# Patient Record
Sex: FEMALE | Race: WHITE | NOT HISPANIC OR LATINO | Employment: OTHER | URBAN - METROPOLITAN AREA
[De-identification: names, ages, dates, MRNs, and addresses within clinical notes are randomized per-mention and may not be internally consistent; named-entity substitution may affect disease eponyms.]

---

## 2017-08-01 ENCOUNTER — HOSPITAL ENCOUNTER (INPATIENT)
Facility: HOSPITAL | Age: 72
LOS: 1 days | Discharge: HOME | End: 2017-08-02
Attending: INTERNAL MEDICINE | Admitting: INTERNAL MEDICINE

## 2017-08-01 DIAGNOSIS — I20.0 UNSTABLE ANGINA (HCC): Primary | ICD-10-CM

## 2017-08-01 PROCEDURE — 93005 ELECTROCARDIOGRAM TRACING: CPT | Performed by: INTERNAL MEDICINE

## 2017-08-01 RX ORDER — ROPINIROLE 2 MG/1
2 TABLET, FILM COATED ORAL NIGHTLY
Status: DISCONTINUED | OUTPATIENT
Start: 2017-08-01 | End: 2017-08-02 | Stop reason: HOSPADM

## 2017-08-01 RX ORDER — TRAMADOL HYDROCHLORIDE 50 MG/1
50 TABLET ORAL EVERY 8 HOURS PRN
Status: DISCONTINUED | OUTPATIENT
Start: 2017-08-01 | End: 2017-08-02 | Stop reason: HOSPADM

## 2017-08-01 RX ORDER — TRIAMCINOLONE ACETONIDE 1 MG/ML
LOTION TOPICAL EVERY 12 HOURS SCHEDULED
Status: DISCONTINUED | OUTPATIENT
Start: 2017-08-01 | End: 2017-08-02 | Stop reason: HOSPADM

## 2017-08-01 RX ORDER — MELOXICAM 15 MG/1
15 TABLET ORAL NIGHTLY PRN
COMMUNITY

## 2017-08-01 RX ORDER — NITROGLYCERIN 20 MG/100ML
5-200 INJECTION INTRAVENOUS
Status: DISCONTINUED | OUTPATIENT
Start: 2017-08-01 | End: 2017-08-02 | Stop reason: HOSPADM

## 2017-08-01 RX ORDER — TRAMADOL HYDROCHLORIDE 50 MG/1
50 TABLET ORAL EVERY 8 HOURS PRN
COMMUNITY

## 2017-08-01 RX ORDER — TRIAMCINOLONE ACETONIDE 1 MG/ML
1 LOTION TOPICAL 2 TIMES DAILY
COMMUNITY

## 2017-08-01 RX ORDER — FENOFIBRATE 160 MG/1
160 TABLET ORAL DAILY
COMMUNITY

## 2017-08-01 RX ORDER — AMLODIPINE BESYLATE 5 MG/1
5 TABLET ORAL
Status: DISCONTINUED | OUTPATIENT
Start: 2017-08-02 | End: 2017-08-02 | Stop reason: HOSPADM

## 2017-08-01 RX ORDER — ATORVASTATIN CALCIUM 40 MG/1
40 TABLET, FILM COATED ORAL NIGHTLY
Status: DISCONTINUED | OUTPATIENT
Start: 2017-08-01 | End: 2017-08-02 | Stop reason: HOSPADM

## 2017-08-01 RX ORDER — OMEPRAZOLE 40 MG/1
40 CAPSULE, DELAYED RELEASE ORAL DAILY
COMMUNITY

## 2017-08-01 RX ORDER — FAMOTIDINE 20 MG/1
40 TABLET, FILM COATED ORAL DAILY
Status: DISCONTINUED | OUTPATIENT
Start: 2017-08-02 | End: 2017-08-02 | Stop reason: HOSPADM

## 2017-08-01 RX ORDER — ROPINIROLE 2 MG/1
2 TABLET, FILM COATED ORAL NIGHTLY
COMMUNITY

## 2017-08-01 RX ORDER — SIMVASTATIN 40 MG
40 TABLET ORAL NIGHTLY
COMMUNITY

## 2017-08-01 RX ORDER — ASPIRIN 325 MG
325 TABLET ORAL DAILY
Status: DISCONTINUED | OUTPATIENT
Start: 2017-08-02 | End: 2017-08-02 | Stop reason: HOSPADM

## 2017-08-01 RX ORDER — GABAPENTIN 400 MG/1
400 CAPSULE ORAL 3 TIMES DAILY
COMMUNITY

## 2017-08-01 RX ORDER — LISINOPRIL 40 MG/1
40 TABLET ORAL
Status: DISCONTINUED | OUTPATIENT
Start: 2017-08-02 | End: 2017-08-02 | Stop reason: HOSPADM

## 2017-08-01 RX ORDER — GABAPENTIN 400 MG/1
400 CAPSULE ORAL 3 TIMES DAILY
Status: DISCONTINUED | OUTPATIENT
Start: 2017-08-01 | End: 2017-08-02 | Stop reason: HOSPADM

## 2017-08-01 RX ORDER — FENOFIBRATE 145 MG/1
160 TABLET, COATED ORAL DAILY
Status: DISCONTINUED | OUTPATIENT
Start: 2017-08-02 | End: 2017-08-02 | Stop reason: HOSPADM

## 2017-08-01 RX ORDER — LISINOPRIL 40 MG/1
40 TABLET ORAL DAILY
COMMUNITY

## 2017-08-01 RX ORDER — MELOXICAM 7.5 MG/1
15 TABLET ORAL DAILY
Status: DISCONTINUED | OUTPATIENT
Start: 2017-08-01 | End: 2017-08-02 | Stop reason: HOSPADM

## 2017-08-01 RX ORDER — AMLODIPINE BESYLATE 5 MG/1
5 TABLET ORAL DAILY
COMMUNITY

## 2017-08-01 RX ADMIN — TRAMADOL HYDROCHLORIDE 50 MG: 50 TABLET, COATED ORAL at 21:29

## 2017-08-01 RX ADMIN — MELOXICAM 15 MG: 7.5 TABLET ORAL at 21:30

## 2017-08-01 RX ADMIN — TRIAMCINOLONE ACETONIDE: 1 LOTION TOPICAL at 21:29

## 2017-08-01 RX ADMIN — NITROGLYCERIN 5 MCG/MIN: 20 INJECTION INTRAVENOUS at 18:36

## 2017-08-01 RX ADMIN — ATORVASTATIN CALCIUM 40 MG: 40 TABLET, FILM COATED ORAL at 21:30

## 2017-08-01 RX ADMIN — ROPINIROLE HYDROCHLORIDE 2 MG: 2 TABLET, FILM COATED ORAL at 21:30

## 2017-08-01 RX ADMIN — GABAPENTIN 400 MG: 400 CAPSULE ORAL at 21:30

## 2017-08-01 NOTE — NURSING NOTE
"ACC REVIEW REPORT: [facility]        PATIENT NAME: Danna Antonio    PATIENT ID: [id number]    BED: 9803    BED TYPE: [facility]    BED GIVEN TO: Val    FLOWER BED GIVEN: 1430    YOB: 1945    AGE: 71    GENDER: female    PREVIOUS ADMIT TO [facility]: no    PREVIOUS ADMISSION DATE: n/a    TODAY'S DATE: 8/1/2017    TRANSFER DATE: 8/1/2017    ETA: 1700    TRANSFERRING FACILITY: [facility]    TRANSFERRING FACILITY PHONE # : [phone]    DATE/TIME REQUEST RECEIVED: 8/1/2017 @ [address] [place] [address] RN: Jessica Mckeon RN    REPORT FROM: Val Greenwood RN    TIME REPORT TAKEN: 1440    DIAGNOSIS: NSTEMI    REASON FOR TRANSFER TO [facility]: Cardiac Cath    TRANSPORTATION: EMS ground    CLINICAL REASON FOR TRANSFER TO [facility]: 71 year old female presented to ER at 1202 c/o chest pain \"on and off for a few days.\"  Describes pain as \"burning\" that radiates through to her back.  Denies SOA or nausea.  Elevated troponin.  NSR on monitor.      CLINICAL INFORMATION      WEIGHT: 71 kg    ALLERGIES: PCN    BLACKWOOD: no    INFECTIOUS DISEASE: no    ISOLATION: no    1ST VITAL SIGNS:   TIME: 1205  PULSE: 82  B/P: 172/97  RESP: 18    LAST VITAL SIGNS:  TIME: 1430  TEMP: 97.0  PULSE: 63  B/P: 151/73  RESP: 16    LAB INFORMATION: Labs WNL    CULTURE INFORMATION: n/a    MEDS/IV FLUIDS: # 22 gauge saline lock right hand.   mg po.  Plavix 600mg po.  Lopressor 5mg IV.      CARDIAC SYSTEM:    CHEST PAIN: currently pain free    RATE: 82 on EKG    RHYTHM: NSR    Is patient taking or has patient been given any drugs that could increase bleeding?   (Plavix, Brilinta, Effient, Eliquis, Xarelto, Warfarin, Integrilin, Angiomax)    DRUG: ASA 325mg.  Plavix 600mg in ER         CARDIAC ENZYMES:    DATE: 7/31/2017  TIME: drawn in family doctor's office  TROP: 0.07    DATE: 8/1/2017  TIME: 1204  TROP: 0.167      RESPIRATORY SYSTEM:    LUNG SOUNDS:  Clear      OXYGEN: yes    O2 SAT: 97 %    ADMINISTRATION ROUTE: nasal cannula    RADIOLOGY RESULTS: " "Cardiomegaly and Emphysema          CNS/MUSCULOSKELETAL    ALERT AND ORIENTED:    PERSON: yes  PLACE: yes  TIME: yes    INJURY:  N/A  STROKE SCALE: N/A        CNS/MUSCULOSKELETAL NOTES: Ambulatory      GI//GY      ABDOMINAL PAIN: No    VOMITING: No    DIARRHEA: No    NAUSEA: No      PAST MEDICAL HISTORY: HTN, Hyperlipidemia, Smoker    OTHER SYMPTOM NOTES: Pt states she has \"never been sick and never been in the hospital\"              Jessica Mckeon RN  8/1/2017  2:50 PM        "

## 2017-08-02 ENCOUNTER — APPOINTMENT (OUTPATIENT)
Dept: CARDIOLOGY | Facility: HOSPITAL | Age: 72
End: 2017-08-02
Attending: INTERNAL MEDICINE

## 2017-08-02 VITALS
HEART RATE: 75 BPM | HEIGHT: 62 IN | BODY MASS INDEX: 30.63 KG/M2 | TEMPERATURE: 98.7 F | RESPIRATION RATE: 16 BRPM | DIASTOLIC BLOOD PRESSURE: 75 MMHG | WEIGHT: 166.45 LBS | SYSTOLIC BLOOD PRESSURE: 131 MMHG | OXYGEN SATURATION: 96 %

## 2017-08-02 LAB
ANION GAP SERPL CALCULATED.3IONS-SCNC: 4 MMOL/L (ref 3–11)
ARTICHOKE IGE QN: 120 MG/DL (ref 0–130)
BH CV ECHO MEAS - AO ROOT AREA (BSA CORRECTED): 1.2
BH CV ECHO MEAS - AO ROOT AREA: 3.6 CM^2
BH CV ECHO MEAS - AO ROOT DIAM: 2.1 CM
BH CV ECHO MEAS - BSA(HAYCOCK): 1.8 M^2
BH CV ECHO MEAS - BSA: 1.8 M^2
BH CV ECHO MEAS - BZI_BMI: 30.4 KILOGRAMS/M^2
BH CV ECHO MEAS - BZI_METRIC_HEIGHT: 157.5 CM
BH CV ECHO MEAS - BZI_METRIC_WEIGHT: 75.3 KG
BH CV ECHO MEAS - EDV(CUBED): 39.5 ML
BH CV ECHO MEAS - EDV(TEICH): 47.6 ML
BH CV ECHO MEAS - EF(CUBED): 64.1 %
BH CV ECHO MEAS - EF(TEICH): 56.8 %
BH CV ECHO MEAS - ESV(CUBED): 14.2 ML
BH CV ECHO MEAS - ESV(TEICH): 20.6 ML
BH CV ECHO MEAS - FS: 28.9 %
BH CV ECHO MEAS - IVS/LVPW: 1
BH CV ECHO MEAS - IVSD: 1.1 CM
BH CV ECHO MEAS - LA DIMENSION: 3.2 CM
BH CV ECHO MEAS - LA/AO: 1.5
BH CV ECHO MEAS - LV MASS(C)D: 110.8 GRAMS
BH CV ECHO MEAS - LV MASS(C)DI: 62.7 GRAMS/M^2
BH CV ECHO MEAS - LVIDD: 3.4 CM
BH CV ECHO MEAS - LVIDS: 2.4 CM
BH CV ECHO MEAS - LVPWD: 1.1 CM
BH CV ECHO MEAS - MV A MAX VEL: 109.6 CM/SEC
BH CV ECHO MEAS - MV DEC SLOPE: 550 CM/SEC^2
BH CV ECHO MEAS - MV DEC TIME: 0.22 SEC
BH CV ECHO MEAS - MV E MAX VEL: 89.3 CM/SEC
BH CV ECHO MEAS - MV E/A: 0.82
BH CV ECHO MEAS - MV P1/2T MAX VEL: 127.1 CM/SEC
BH CV ECHO MEAS - MV P1/2T: 67.7 MSEC
BH CV ECHO MEAS - MVA P1/2T LCG: 1.7 CM^2
BH CV ECHO MEAS - MVA(P1/2T): 3.3 CM^2
BH CV ECHO MEAS - PA ACC SLOPE: 553.2 CM/SEC^2
BH CV ECHO MEAS - PA ACC TIME: 0.13 SEC
BH CV ECHO MEAS - PA PR(ACCEL): 20.4 MMHG
BH CV ECHO MEAS - RAP SYSTOLE: 3 MMHG
BH CV ECHO MEAS - RV MAX PG: 1.2 MMHG
BH CV ECHO MEAS - RV V1 MAX: 55.8 CM/SEC
BH CV ECHO MEAS - RVSP: 35 MMHG
BH CV ECHO MEAS - SI(CUBED): 14.3 ML/M^2
BH CV ECHO MEAS - SI(TEICH): 15.3 ML/M^2
BH CV ECHO MEAS - SV(CUBED): 25.3 ML
BH CV ECHO MEAS - SV(TEICH): 27.1 ML
BH CV ECHO MEAS - TAPSE (>1.6): 2.3 CM2
BH CV ECHO MEAS - TR MAX VEL: 284 CM/SEC
BH CV XLRA - RV BASE: 3.7 CM
BH CV XLRA - RV LENGTH: 6.8 CM
BH CV XLRA - RV MID: 3.1 CM
BH CV XLRA - TDI S': 12.4 CM/SEC
BUN BLD-MCNC: 18 MG/DL (ref 9–23)
BUN/CREAT SERPL: 22.5 (ref 7–25)
CALCIUM SPEC-SCNC: 8.9 MG/DL (ref 8.7–10.4)
CHLORIDE SERPL-SCNC: 106 MMOL/L (ref 99–109)
CHOLEST SERPL-MCNC: 185 MG/DL (ref 0–200)
CO2 SERPL-SCNC: 30 MMOL/L (ref 20–31)
CREAT BLD-MCNC: 0.8 MG/DL (ref 0.6–1.3)
DEPRECATED RDW RBC AUTO: 45.1 FL (ref 37–54)
ERYTHROCYTE [DISTWIDTH] IN BLOOD BY AUTOMATED COUNT: 13.1 % (ref 11.3–14.5)
GFR SERPL CREATININE-BSD FRML MDRD: 71 ML/MIN/1.73
GLUCOSE BLD-MCNC: 100 MG/DL (ref 70–100)
HCT VFR BLD AUTO: 32.7 % (ref 34.5–44)
HDLC SERPL-MCNC: 45 MG/DL (ref 40–60)
HGB BLD-MCNC: 10.8 G/DL (ref 11.5–15.5)
LEFT ATRIUM VOLUME INDEX: 24.9 ML/M2
LEFT ATRIUM VOLUME: 44 CM3
MCH RBC QN AUTO: 31 PG (ref 27–31)
MCHC RBC AUTO-ENTMCNC: 33 G/DL (ref 32–36)
MCV RBC AUTO: 94 FL (ref 80–99)
PLATELET # BLD AUTO: 209 10*3/MM3 (ref 150–450)
PMV BLD AUTO: 10.2 FL (ref 6–12)
POTASSIUM BLD-SCNC: 4.3 MMOL/L (ref 3.5–5.5)
RBC # BLD AUTO: 3.48 10*6/MM3 (ref 3.89–5.14)
SODIUM BLD-SCNC: 140 MMOL/L (ref 132–146)
TRIGL SERPL-MCNC: 221 MG/DL (ref 0–150)
TROPONIN I SERPL-MCNC: 0.17 NG/ML
WBC NRBC COR # BLD: 4.46 10*3/MM3 (ref 3.5–10.8)

## 2017-08-02 PROCEDURE — 027034Z DILATION OF CORONARY ARTERY, ONE ARTERY WITH DRUG-ELUTING INTRALUMINAL DEVICE, PERCUTANEOUS APPROACH: ICD-10-PCS | Performed by: INTERNAL MEDICINE

## 2017-08-02 PROCEDURE — C9600 PERC DRUG-EL COR STENT SING: HCPCS | Performed by: INTERNAL MEDICINE

## 2017-08-02 PROCEDURE — 0 IOPAMIDOL PER 1 ML: Performed by: INTERNAL MEDICINE

## 2017-08-02 PROCEDURE — 84484 ASSAY OF TROPONIN QUANT: CPT | Performed by: INTERNAL MEDICINE

## 2017-08-02 PROCEDURE — 25010000002 FENTANYL CITRATE (PF) 100 MCG/2ML SOLUTION: Performed by: INTERNAL MEDICINE

## 2017-08-02 PROCEDURE — 93571 IV DOP VEL&/PRESS C FLO 1ST: CPT | Performed by: INTERNAL MEDICINE

## 2017-08-02 PROCEDURE — 80048 BASIC METABOLIC PNL TOTAL CA: CPT | Performed by: INTERNAL MEDICINE

## 2017-08-02 PROCEDURE — 25010000002 MIDAZOLAM PER 1 MG: Performed by: INTERNAL MEDICINE

## 2017-08-02 PROCEDURE — 93306 TTE W/DOPPLER COMPLETE: CPT

## 2017-08-02 PROCEDURE — C1753 CATH, INTRAVAS ULTRASOUND: HCPCS | Performed by: INTERNAL MEDICINE

## 2017-08-02 PROCEDURE — C1894 INTRO/SHEATH, NON-LASER: HCPCS | Performed by: INTERNAL MEDICINE

## 2017-08-02 PROCEDURE — 4A023N7 MEASUREMENT OF CARDIAC SAMPLING AND PRESSURE, LEFT HEART, PERCUTANEOUS APPROACH: ICD-10-PCS | Performed by: INTERNAL MEDICINE

## 2017-08-02 PROCEDURE — C1769 GUIDE WIRE: HCPCS | Performed by: INTERNAL MEDICINE

## 2017-08-02 PROCEDURE — 93458 L HRT ARTERY/VENTRICLE ANGIO: CPT | Performed by: INTERNAL MEDICINE

## 2017-08-02 PROCEDURE — 25010000002 HEPARIN (PORCINE) PER 1000 UNITS: Performed by: INTERNAL MEDICINE

## 2017-08-02 PROCEDURE — 80061 LIPID PANEL: CPT | Performed by: INTERNAL MEDICINE

## 2017-08-02 PROCEDURE — 25010000002 BIVALIRUDIN PER 1 MG: Performed by: INTERNAL MEDICINE

## 2017-08-02 PROCEDURE — 93005 ELECTROCARDIOGRAM TRACING: CPT | Performed by: INTERNAL MEDICINE

## 2017-08-02 PROCEDURE — 92978 ENDOLUMINL IVUS OCT C 1ST: CPT | Performed by: INTERNAL MEDICINE

## 2017-08-02 PROCEDURE — 85027 COMPLETE CBC AUTOMATED: CPT | Performed by: INTERNAL MEDICINE

## 2017-08-02 PROCEDURE — C1874 STENT, COATED/COV W/DEL SYS: HCPCS | Performed by: INTERNAL MEDICINE

## 2017-08-02 PROCEDURE — B2111ZZ FLUOROSCOPY OF MULTIPLE CORONARY ARTERIES USING LOW OSMOLAR CONTRAST: ICD-10-PCS | Performed by: INTERNAL MEDICINE

## 2017-08-02 PROCEDURE — 25010000002 BIVALIRUDIN 5 MG/ML: Performed by: INTERNAL MEDICINE

## 2017-08-02 PROCEDURE — B240ZZ3 ULTRASONOGRAPHY OF SINGLE CORONARY ARTERY, INTRAVASCULAR: ICD-10-PCS | Performed by: INTERNAL MEDICINE

## 2017-08-02 DEVICE — IMPLANTABLE DEVICE: Type: IMPLANTABLE DEVICE | Status: FUNCTIONAL

## 2017-08-02 RX ORDER — SODIUM CHLORIDE 9 MG/ML
250 INJECTION, SOLUTION INTRAVENOUS CONTINUOUS
Status: ACTIVE | OUTPATIENT
Start: 2017-08-02 | End: 2017-08-02

## 2017-08-02 RX ORDER — HYDROCODONE BITARTRATE AND ACETAMINOPHEN 5; 325 MG/1; MG/1
1 TABLET ORAL EVERY 4 HOURS PRN
Status: DISCONTINUED | OUTPATIENT
Start: 2017-08-02 | End: 2017-08-02 | Stop reason: HOSPADM

## 2017-08-02 RX ORDER — NALOXONE HCL 0.4 MG/ML
0.4 VIAL (ML) INJECTION
Status: DISCONTINUED | OUTPATIENT
Start: 2017-08-02 | End: 2017-08-02 | Stop reason: HOSPADM

## 2017-08-02 RX ORDER — ACETAMINOPHEN 325 MG/1
650 TABLET ORAL EVERY 4 HOURS PRN
Status: DISCONTINUED | OUTPATIENT
Start: 2017-08-02 | End: 2017-08-02 | Stop reason: HOSPADM

## 2017-08-02 RX ORDER — MORPHINE SULFATE 4 MG/ML
1 INJECTION, SOLUTION INTRAMUSCULAR; INTRAVENOUS EVERY 4 HOURS PRN
Status: DISCONTINUED | OUTPATIENT
Start: 2017-08-02 | End: 2017-08-02 | Stop reason: HOSPADM

## 2017-08-02 RX ORDER — ALPRAZOLAM 0.25 MG/1
0.25 TABLET ORAL 3 TIMES DAILY PRN
Status: DISCONTINUED | OUTPATIENT
Start: 2017-08-02 | End: 2017-08-02 | Stop reason: HOSPADM

## 2017-08-02 RX ORDER — LIDOCAINE HYDROCHLORIDE 10 MG/ML
INJECTION, SOLUTION INFILTRATION; PERINEURAL AS NEEDED
Status: DISCONTINUED | OUTPATIENT
Start: 2017-08-02 | End: 2017-08-02 | Stop reason: HOSPADM

## 2017-08-02 RX ORDER — TEMAZEPAM 7.5 MG/1
7.5 CAPSULE ORAL NIGHTLY PRN
Status: DISCONTINUED | OUTPATIENT
Start: 2017-08-02 | End: 2017-08-02 | Stop reason: HOSPADM

## 2017-08-02 RX ORDER — CLOPIDOGREL BISULFATE 75 MG/1
TABLET ORAL AS NEEDED
Status: DISCONTINUED | OUTPATIENT
Start: 2017-08-02 | End: 2017-08-02 | Stop reason: HOSPADM

## 2017-08-02 RX ORDER — FENTANYL CITRATE 50 UG/ML
INJECTION, SOLUTION INTRAMUSCULAR; INTRAVENOUS AS NEEDED
Status: DISCONTINUED | OUTPATIENT
Start: 2017-08-02 | End: 2017-08-02 | Stop reason: HOSPADM

## 2017-08-02 RX ORDER — MIDAZOLAM HYDROCHLORIDE 1 MG/ML
INJECTION INTRAMUSCULAR; INTRAVENOUS AS NEEDED
Status: DISCONTINUED | OUTPATIENT
Start: 2017-08-02 | End: 2017-08-02 | Stop reason: HOSPADM

## 2017-08-02 RX ADMIN — TRAMADOL HYDROCHLORIDE 50 MG: 50 TABLET, COATED ORAL at 08:21

## 2017-08-02 RX ADMIN — ASPIRIN 325 MG ORAL TABLET 325 MG: 325 PILL ORAL at 08:21

## 2017-08-02 RX ADMIN — AMLODIPINE BESYLATE 5 MG: 5 TABLET ORAL at 08:21

## 2017-08-02 RX ADMIN — FAMOTIDINE 40 MG: 20 TABLET ORAL at 08:21

## 2017-08-02 RX ADMIN — LISINOPRIL 40 MG: 40 TABLET ORAL at 08:22

## 2017-08-02 RX ADMIN — FENOFIBRATE 145 MG: 145 TABLET ORAL at 08:21

## 2017-08-02 RX ADMIN — GABAPENTIN 400 MG: 400 CAPSULE ORAL at 08:21

## 2017-08-02 NOTE — H&P
Pre-cardiac Catheterization Report  Cardiovascular Laboratory  [facility]      Patient:  Danna Antonio  :  1945  PCP:  No Known Provider  PHONE:  None    DATE: 2017    BRIEF HPI:  Danna Antonio is a 71 y.o. female with hypertension, hypercholesterolemia, and ongoing tobacco abuse.  She is complaining of a 1-1/2 week history of chest pain which she describes as indigestion.  She states that it is moderate in severity lasting minutes.  States that it radiates to her back.  She denies any shortness of breath, palpitations, or nausea.  But does have episodes of diaphoresis with her chest pain.  She is been transferred to [facility] for left heart catheterization with possible intervention.    Cardiac Risk Factors:  advanced age (older than 55 for men, 65 for women), dyslipidemia, family history of premature cardiovascular disease, hypertension, smoking/ tobacco exposure    Anginal class in last 2 weeks:  CCS class III    CHF Class in last 2 weeks:  NYHA Class I    Cardiogenic shock:  no    Cardiac arrest <24 hours:  no    Stress test within last 6 months:   no   Details:    Previous cardiac catheterization:  no  Details:     Previous CABG:  no  Details:      Allergies:     IV contrast allergy:  no  Allergies   Allergen Reactions   • Penicillins        MEDICATIONS:  Prior to Admission medications    Medication Sig Start Date End Date Taking? Authorizing Provider   amLODIPine (NORVASC) 5 MG tablet Take 5 mg by mouth Daily.   Yes Historical Provider, MD   fenofibrate 160 MG tablet Take 160 mg by mouth Daily.   Yes Historical Provider, MD   gabapentin (NEURONTIN) 400 MG capsule Take 400 mg by mouth 3 (Three) Times a Day.   Yes Historical Provider, MD   lisinopril (PRINIVIL,ZESTRIL) 40 MG tablet Take 40 mg by mouth Daily.   Yes Historical Provider, MD   meloxicam (MOBIC) 15 MG tablet Take 15 mg by mouth At Night As Needed.   Yes Historical Provider, MD   omeprazole (priLOSEC) 40 MG  capsule Take 40 mg by mouth Daily.   Yes Historical Provider, MD   rOPINIRole (REQUIP) 2 MG tablet Take 2 mg by mouth Every Night.   Yes Historical Provider, MD   simvastatin (ZOCOR) 40 MG tablet Take 40 mg by mouth Every Night.   Yes Historical Provider, MD   traMADol (ULTRAM) 50 MG tablet Take 50 mg by mouth Every 8 (Eight) Hours As Needed for Moderate Pain (4-6).   Yes Historical Provider, MD   triamcinolone (KENALOG) 0.1 % lotion Apply 1 application topically 2 (Two) Times a Day.   Yes Historical Provider, MD       Past medical & surgical history, social and family history reviewed in the electronic medical record.    ROS:  Cardiovascular ROS: positive for - chest pain and diaphoresis    Physical Exam:    Vitals:   Vitals:    08/02/17 0821   BP: 130/67   Pulse:    Resp:    Temp:    SpO2:     Last Weight    08/01/17  1728   Weight: 166 lb 7.2 oz (75.5 kg)       General Appearance:    Alert, cooperative, in no acute distress   Head:    Normocephalic, without obvious abnormality, atraumatic   Eyes:            Lids and lashes normal, conjunctivae and sclerae normal, no   icterus, no pallor, corneas clear, PERRLA   Ears:    Ears appear intact with no abnormalities noted   Throat:      Neck:   No adenopathy, supple, trachea midline, no thyromegaly, no     carotid bruit, no JVD   Back:     No kyphosis present, no scoliosis present, range of motion normal   Lungs:     Clear to auscultation,respirations regular, even and                   unlabored    Heart:    Regular rhythm and normal rate, normal S1 and S2, no            murmur, no gallop, no rub, no click   Breast Exam:    Deferred   Abdomen:     Normal bowel sounds, no masses, no organomegaly, soft        non-tender, non-distended, no guarding, no rebound                 tenderness   Genitalia:    Deferred   Extremities:   Moves all extremities well, no edema, no cyanosis, no              redness   Pulses:   Pulses palpable and equal bilaterally   Skin:   No  bleeding, bruising or rash   Lymph nodes:      Neurologic:   Cranial nerves 2 - 12 grossly intact, sensation intact       Barbaeu Test:  Left: Normal  (oxymetric Allens) Right: Not Assessed     Results from last 7 days  Lab Units 08/02/17  0516   SODIUM mmol/L 140   POTASSIUM mmol/L 4.3   CHLORIDE mmol/L 106   CO2 mmol/L 30.0   BUN mg/dL 18   CREATININE mg/dL 0.80   GLUCOSE mg/dL 100   CALCIUM mg/dL 8.9       Results from last 7 days  Lab Units 08/02/17  0516   WBC 10*3/mm3 4.46   HEMOGLOBIN g/dL 10.8*   HEMATOCRIT % 32.7*   PLATELETS 10*3/mm3 209     Lab Results   Component Value Date    TRIG 221 (H) 08/02/2017    HDL 45 08/02/2017    LDLDIRECT 120 08/02/2017           IMPRESSION:  · Unstable angina    PLAN:  · Procedure to perform: [facility]  · Planned access:  Left radial artery

## 2017-08-03 NOTE — PLAN OF CARE
Problem: Patient Care Overview (Adult)  Goal: Plan of Care Review  Outcome: Outcome(s) achieved Date Met:  08/02/17 08/02/17 2022   Coping/Psychosocial Response Interventions   Plan Of Care Reviewed With patient;daughter   Patient Care Overview   Progress improving   Outcome Evaluation   Outcome Summary/Follow up Plan pt and family able to verbalize all discharge instructions. no questions at this time.       Goal: Discharge Needs Assessment  Outcome: Outcome(s) achieved Date Met:  08/02/17    Problem: Cardiac Catheterization with/without PCI (Adult)  Goal: Signs and Symptoms of Listed Potential Problems Will be Absent or Manageable (Cardiac Catheterization with/without PCI)  Outcome: Outcome(s) achieved Date Met:  08/02/17 08/02/17 2022   Cardiac Catheterization with/without PCI   Problems Assessed (Cardiac Catheterization) all   Problems Present (Cardiac Catheterization) none

## 2017-08-14 ENCOUNTER — DOCUMENTATION (OUTPATIENT)
Dept: CARDIAC REHAB | Facility: HOSPITAL | Age: 72
End: 2017-08-14

## 2017-08-14 NOTE — PROGRESS NOTES
Pt. Identified as qualifier for Phase II Cardiac Rehab. Staff attempted to leave detailed message with Patient to return phone call in regards to scheduling an appointment. Staff will call back at a later date.

## (undated) DEVICE — MODEL BT2000 P/N 700287-012KIT CONTENTS: MANIFOLD WITH SALINE AND CONTRAST PORTS, SALINE TUBING WITH SPIKE AND HAND SYRINGE, TRANSDUCER: Brand: BT2000 AUTOMATED MANIFOLD KIT

## (undated) DEVICE — DEV INFL MONARCH 25W

## (undated) DEVICE — CATH DIAG EXPO M/ PK 5F FL4/FR4 PIG

## (undated) DEVICE — RUNTHROUGH NS EXTRA FLOPPY PTCA GUIDEWIRE: Brand: RUNTHROUGH

## (undated) DEVICE — DEV COMP RAD PRELUDESYNC 24CM

## (undated) DEVICE — GLIDESHEATH SLENDER STAINLESS STEEL KIT: Brand: GLIDESHEATH SLENDER

## (undated) DEVICE — CATH DIAG EXPO .045 FL3  5F 100CM

## (undated) DEVICE — PK CATH CARD 10

## (undated) DEVICE — MODEL AT P54, P/N 700608-035KIT CONTENTS: HAND CONTROLLER, 3-WAY HIGH-PRESSURE STOPCOCK WITH ROTATING END AND PREMIUM HIGH-PRESSURE TUBING: Brand: ANGIOTOUCH® KIT

## (undated) DEVICE — CANNULA,ADULT,SOFT-TOUCH,7'TUBE,UC: Brand: PENDING

## (undated) DEVICE — GW INQWIRE FC PTFE STD J/1.5 .035 260

## (undated) DEVICE — KODAMA CATHETER, P/N 701470CONTENTS: IMAGING CATHETER, 3 ML SYRINGE, 10 ML SYRINGE, EXTENSION SET, STERILE BAG, IFU: Brand: ACIST KODAMA® CORONARY IMAGING CATHETER

## (undated) DEVICE — A2000 MULTI-USE SYRINGE KIT, P/N 701277-003KIT CONTENTS: 100ML CONTRAST RESERVOIR AND TUBING WITH CONTRAST SPIKE AND CLAMP: Brand: A2000 MULTI-USE SYRINGE KIT